# Patient Record
Sex: FEMALE | Race: WHITE | NOT HISPANIC OR LATINO
[De-identification: names, ages, dates, MRNs, and addresses within clinical notes are randomized per-mention and may not be internally consistent; named-entity substitution may affect disease eponyms.]

---

## 2019-03-30 ENCOUNTER — TRANSCRIPTION ENCOUNTER (OUTPATIENT)
Age: 54
End: 2019-03-30

## 2019-08-14 ENCOUNTER — RESULT REVIEW (OUTPATIENT)
Age: 54
End: 2019-08-14

## 2019-08-27 ENCOUNTER — OUTPATIENT (OUTPATIENT)
Dept: OUTPATIENT SERVICES | Facility: HOSPITAL | Age: 54
LOS: 1 days | End: 2019-08-27
Payer: COMMERCIAL

## 2019-08-27 DIAGNOSIS — C50.219 MALIGNANT NEOPLASM OF UPPER-INNER QUADRANT OF UNSPECIFIED FEMALE BREAST: ICD-10-CM

## 2019-08-27 LAB — SURGICAL PATHOLOGY STUDY: SIGNIFICANT CHANGE UP

## 2019-08-27 PROCEDURE — 88321 CONSLTJ&REPRT SLD PREP ELSWR: CPT

## 2019-08-28 ENCOUNTER — OUTPATIENT (OUTPATIENT)
Dept: OUTPATIENT SERVICES | Facility: HOSPITAL | Age: 54
LOS: 1 days | End: 2019-08-28
Payer: COMMERCIAL

## 2019-08-28 ENCOUNTER — APPOINTMENT (OUTPATIENT)
Dept: ULTRASOUND IMAGING | Facility: HOSPITAL | Age: 54
End: 2019-08-28
Payer: COMMERCIAL

## 2019-08-28 VITALS
WEIGHT: 175.93 LBS | OXYGEN SATURATION: 96 % | TEMPERATURE: 97 F | SYSTOLIC BLOOD PRESSURE: 118 MMHG | RESPIRATION RATE: 16 BRPM | HEIGHT: 66 IN | HEART RATE: 75 BPM | DIASTOLIC BLOOD PRESSURE: 70 MMHG

## 2019-08-28 PROCEDURE — 78195 LYMPH SYSTEM IMAGING: CPT

## 2019-08-28 PROCEDURE — 77065 DX MAMMO INCL CAD UNI: CPT

## 2019-08-28 PROCEDURE — C1739: CPT

## 2019-08-28 PROCEDURE — 78195 LYMPH SYSTEM IMAGING: CPT | Mod: 26

## 2019-08-28 PROCEDURE — 19285 PERQ DEV BREAST 1ST US IMAG: CPT

## 2019-08-28 PROCEDURE — 77065 DX MAMMO INCL CAD UNI: CPT | Mod: 26,RT

## 2019-08-28 PROCEDURE — A9541: CPT

## 2019-08-28 PROCEDURE — 19285 PERQ DEV BREAST 1ST US IMAG: CPT | Mod: RT

## 2019-08-28 NOTE — ASU PATIENT PROFILE, ADULT - PMH
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

## 2019-08-28 NOTE — ASU PATIENT PROFILE, ADULT - VISION (WITH CORRECTIVE LENSES IF THE PATIENT USUALLY WEARS THEM):
Normal vision: sees adequately in most situations; can see medication labels, newsprint DVT: HSQ  GOC: full code  Dispo: once pain is more controlled; will offer PT eval

## 2019-08-28 NOTE — ASU PATIENT PROFILE, ADULT - NS PRO AD PATIENT TYPE
Health Care Proxy (HCP) oneyda [phone]/Health Care Proxy (HCP) oneyda fragoso [phone] boyfriend/Health Care Proxy (HCP)

## 2019-08-29 ENCOUNTER — OUTPATIENT (OUTPATIENT)
Dept: OUTPATIENT SERVICES | Facility: HOSPITAL | Age: 54
LOS: 1 days | Discharge: HOME | End: 2019-08-29
Payer: COMMERCIAL

## 2019-08-29 ENCOUNTER — RESULT REVIEW (OUTPATIENT)
Age: 54
End: 2019-08-29

## 2019-08-29 VITALS — DIASTOLIC BLOOD PRESSURE: 75 MMHG | HEART RATE: 95 BPM | OXYGEN SATURATION: 96 % | SYSTOLIC BLOOD PRESSURE: 118 MMHG

## 2019-08-29 PROCEDURE — 88307 TISSUE EXAM BY PATHOLOGIST: CPT

## 2019-08-29 PROCEDURE — A4648: CPT

## 2019-08-29 PROCEDURE — 38525 BIOPSY/REMOVAL LYMPH NODES: CPT | Mod: RT

## 2019-08-29 PROCEDURE — 19499 UNLISTED PROCEDURE BREAST: CPT

## 2019-08-29 PROCEDURE — 88341 IMHCHEM/IMCYTCHM EA ADD ANTB: CPT

## 2019-08-29 PROCEDURE — 88305 TISSUE EXAM BY PATHOLOGIST: CPT

## 2019-08-29 PROCEDURE — 88360 TUMOR IMMUNOHISTOCHEM/MANUAL: CPT

## 2019-08-29 PROCEDURE — 76098 X-RAY EXAM SURGICAL SPECIMEN: CPT | Mod: 26

## 2019-08-29 PROCEDURE — 19301 PARTIAL MASTECTOMY: CPT | Mod: RT

## 2019-08-29 PROCEDURE — 76098 X-RAY EXAM SURGICAL SPECIMEN: CPT

## 2019-08-29 NOTE — PACU DISCHARGE NOTE - HYDRATION STATUS:
Satisfactory
Asthma    CAD (coronary artery disease)    Cancer of neck  s/p radiation 4-5 years ago  Depression    Diabetes mellitus    Gout    High cholesterol    HTN (hypertension)    Osteoporosis    PUD (peptic ulcer disease)

## 2019-08-29 NOTE — BRIEF OPERATIVE NOTE - OPERATION/FINDINGS
Excision of sentinel lymph node through incision inferior to the hair-bearing area using Renee  marker localization and methylene blue. Excision of breast mass through bridgett-areolar incision using Renee  marker localization. Biopsy clip & Renee  marker identified on intraoperative imaging. Additional  breast tissue excised for margin. Hemostasis achieved. Local tissue rearranged using 2-0 vicryl sutures. Primary closure using Vicryl & Monocryl sutures w/ dermabond.

## 2019-08-29 NOTE — BRIEF OPERATIVE NOTE - NSICDXBRIEFPROCEDURE_GEN_ALL_CORE_FT
PROCEDURES:  Lumpectomy of right breast with sentinel node biopsy 29-Aug-2019 10:26:12  Liliam Pappas

## 2019-08-29 NOTE — PACU DISCHARGE NOTE - COMMENTS
Pt denies pain and discomfort; operative sites clean, dry and intact with dermabond, surgical bra maintained; discharge instructions and med rec endorsed with verbalized understanding. Pt d/c home with  Frieda

## 2019-09-05 LAB — SURGICAL PATHOLOGY STUDY: SIGNIFICANT CHANGE UP

## 2020-06-11 NOTE — ASU PREOP CHECKLIST - VIA
Encounter addended by: Addis Turner RN on: 6/11/2020 5:42 PM   Actions taken: Charge Capture section accepted ambulate

## 2022-01-13 PROBLEM — C80.1 MALIGNANT (PRIMARY) NEOPLASM, UNSPECIFIED: Chronic | Status: ACTIVE | Noted: 2019-08-28

## 2022-01-13 PROBLEM — F41.9 ANXIETY DISORDER, UNSPECIFIED: Chronic | Status: ACTIVE | Noted: 2019-08-28

## 2022-01-13 PROBLEM — Q60.0 RENAL AGENESIS, UNILATERAL: Chronic | Status: ACTIVE | Noted: 2019-08-28

## 2022-01-13 PROBLEM — K58.9 IRRITABLE BOWEL SYNDROME WITHOUT DIARRHEA: Chronic | Status: ACTIVE | Noted: 2019-08-28

## 2022-01-19 ENCOUNTER — APPOINTMENT (OUTPATIENT)
Dept: ORTHOPEDIC SURGERY | Facility: CLINIC | Age: 57
End: 2022-01-19
Payer: MEDICAID

## 2022-01-19 VITALS
SYSTOLIC BLOOD PRESSURE: 128 MMHG | WEIGHT: 180 LBS | HEART RATE: 96 BPM | HEIGHT: 66 IN | BODY MASS INDEX: 28.93 KG/M2 | OXYGEN SATURATION: 98 % | DIASTOLIC BLOOD PRESSURE: 76 MMHG

## 2022-01-19 DIAGNOSIS — Z82.61 FAMILY HISTORY OF ARTHRITIS: ICD-10-CM

## 2022-01-19 DIAGNOSIS — Z80.3 FAMILY HISTORY OF MALIGNANT NEOPLASM OF BREAST: ICD-10-CM

## 2022-01-19 DIAGNOSIS — Z78.9 OTHER SPECIFIED HEALTH STATUS: ICD-10-CM

## 2022-01-19 DIAGNOSIS — Z80.8 FAMILY HISTORY OF MALIGNANT NEOPLASM OF OTHER ORGANS OR SYSTEMS: ICD-10-CM

## 2022-01-19 DIAGNOSIS — Z80.0 FAMILY HISTORY OF MALIGNANT NEOPLASM OF DIGESTIVE ORGANS: ICD-10-CM

## 2022-01-19 DIAGNOSIS — Z60.2 PROBLEMS RELATED TO LIVING ALONE: ICD-10-CM

## 2022-01-19 DIAGNOSIS — Z72.3 LACK OF PHYSICAL EXERCISE: ICD-10-CM

## 2022-01-19 PROCEDURE — 72110 X-RAY EXAM L-2 SPINE 4/>VWS: CPT

## 2022-01-19 PROCEDURE — 20611 DRAIN/INJ JOINT/BURSA W/US: CPT | Mod: RT

## 2022-01-19 PROCEDURE — 99204 OFFICE O/P NEW MOD 45 MIN: CPT | Mod: 25

## 2022-01-19 RX ORDER — ALPRAZOLAM 2 MG/1
TABLET ORAL
Refills: 0 | Status: ACTIVE | COMMUNITY

## 2022-01-19 SDOH — SOCIAL STABILITY - SOCIAL INSECURITY: PROBLEMS RELATED TO LIVING ALONE: Z60.2

## 2022-01-19 NOTE — PHYSICAL EXAM
[de-identified] : General: Well-nourished, well-developed, alert, and in no acute distress.\par Head: Normocephalic.\par Eyes: Pupils equal round reactive to light and accommodation, extraocular muscles intact, normal sclera.\par Nose: No nasal discharge.\par Cardiac: Regular rate. Extremities are warm and well perfused. Distal pulses are symmetric bilaterally.\par Respiratory: No labored breathing.\par Extremities: Sensation is intact distally bilaterally.  Distal pulses are symmetric bilaterally\par Lymphatic: No regional lymphadenopathy, no lymphedema\par Neurologic: No focal deficits\par Skin: Normal skin color, texture, and turgor\par Psychiatric: Normal affect\par MSK: as noted above/below\par \par \par \par Low Back:\par \par Inspection:\par ·	Alignment: No scoliosis or deformity. \par ·	Spasm not noted. \par ·	No scars\par \par Palpation:   \par ·	SI  Joints:  NO pain\par ·	Iliolumbar ligaments    RIGHT-SIDED pain\par ·	Quadratus lumborum   RIGHT-SIDED pain\par ·	Interspinous ligament  NO pain\par ·	Greater trochanter  RIGHT-SIDED pain\par ·	Mid thoracic spine: NO  pain\par \par \par ROM: \par ·	 Flexion:  70 degrees, without pain.\par ·	Extension:  5-10 degrees WITH MILD PAIN\par ·	Side Bending:  Full, without pain \par MILD PAIN WITH FACET LOADING ON RIGHT\par \par \par Strength: \par ·	Core:  Trendelenburg: FAIR-GOOD\par ·	Hip / Leg Flexion, Extension  5/5\par ·	Foot Eversion/ Inversion:   5/5\par ·	Calf:   5/5\par ·	EHL:  5/5\par \par NEURO  \par ·	Sensation:   Intact throughout the lower extremity\par ·	DTR's:  Symmetric throughout the lower extremity.  \par ·	Upper Motor Neuron:\par                 Toro Test: NEGATIVE\par                 Romberg Test: NEGATIVE\par                 Clonus: NEGATIVE\par \par Other tests: \par ·	Slump test: EQUIVOCAL ON RIGHT\par ·	Straight leg raise test:   NEGATIVE\par \par Vascular:  \par ·	No cyanosis, clubbing, edema.  \par ·	Palpable dorsalis pedis.\par \par \par LEFT HIP:\par \par Inspection: no bruising, erythema, rash, deformity \par Palpation: No Greater Trochanter pain , ITB pain , Hip Flexor pain  , Piriformis pain , Proximal Hamstring Pain , Groin pain \par ROM: normal Internal Rotation , External Rotation\par Strength: 5/5 Hip Flexion, Hip Extension, Hip Abduction, Hip Adduction\par \par Distal Pulses: normal\par Lower Extremity Sensation: normal \par Patellar/Achilles Reflex: normal\par \par Special Tests:\par Stinchfield: NEGATIVE \par Log Roll: NEGATIVE\par FABERE: NEGATIVE\par FADIR: NEGATIVE\par Obers: NEGATIVE\par \par \par RIGHT HIP:\par \par Inspection: no bruising, erythema, rash, deformity \par Palpation: PAIN AT GREATER TROCH/GLUTE MAX/PIRIFORMIS/ITB, NO Hip Flexor pain  , Proximal Hamstring Pain , Groin pain \par ROM: normal Internal Rotation , External Rotation\par Strength: 5/5 Hip Flexion, Hip Extension, Hip Abduction, Hip Adduction\par \par Distal Pulses: normal\par Lower Extremity Sensation: normal \par Patellar/Achilles Reflex: normal\par \par Special Tests:\par Stinchfield: NEGATIVE \par Log Roll: NEGATIVE\par FABERE: MILDLY POSITIVE\par FADIR: NEGATIVE\par Obers: POSITIVE\par \par  [de-identified] : Xray Lumbosacral Spine - Multiple views were reviewed with the patient in detail.  There is normal curvature.  There is no acute fracture.  There does not appear to be instability with flexion/extension.  Vertebral heights appear preserved.  Mild degenerative disc disease, prominent at L3-L4, L5-S1.  Facet arthropathy.\par \par

## 2022-01-19 NOTE — REVIEW OF SYSTEMS
[Negative] : Heme/Lymph [Joint Stiffness] : joint stiffness [Recent Weight Gain (___ Lbs)] : recent ~M [unfilled] lbs weight gain [Anxiety] : anxiety [Depression] : depression [Sleep Disturbances] : ~T sleep disturbances [Feeling Weak] : feeling weak [Muscle Weakness] : muscle weakness [Hot Flashes] : hot flashes

## 2022-01-19 NOTE — HISTORY OF PRESENT ILLNESS
[de-identified] : The patient is a 56 year old woman presenting with low back pain\par \par The patient has a history of breast CA, and has been in remission.\par \par She presents with a nearly 6 year history of chronic, atraumatic right-sided low back and lateral hip/gluteal pain.  The patient denies red flag symptoms including fever, chills, weight loss, night sweats, bowel/bladder dysfunction, saddle anesthesia.  Pain does not radiate past hip.  Some new, occasional radiation to anterior thigh.  She has been putting off symptoms because of ongoing care related to breast CA.  She had workup for fibromyalgia which was equivocal.  Pain is mostly activity based.  She has tried PT and chiropractics in the past which provide mild temporary relief of symptoms.  Accupressure and soft tissue seem to help periodically.\par \par Pain is rated 7/10, described as dull, improved with lying supine, worse with prolonged walking. [7] : a current pain level of 7/10

## 2022-01-19 NOTE — DISCUSSION/SUMMARY
[Medication Risks Reviewed] : Medication risks reviewed [de-identified] : The patient is a 56 year old woman with history of breast CA presenting with chronic, right-sided low back/lateral hip/glute pain.  She has mild L3-L4 disc disease, but no neurologic deficits.  Pain may be multifactorial in nature secondary to facet-related pain, greater trochanteric pain, gluteal tendinopathy/deep gluteal syndrome.\par \par Imaging/Diagnostics/Medical Records were interpreted and/or reviewed and results were reviewed with the patient in detail.  All questions were answered appropriately.\par \par \par I discussed the treatment of low back pain with the patient at length today. I described the spectrum of treatment from nonoperative modalities to surgery. Noninvasive and nonoperative treatment modalities include relative rest, weight reduction, activity modification, PRN use of acetaminophen/ anti-inflammatory medication if tolerated, corticosteroids if indicated, home exercise program and physical therapy.  We also discussed adjunctive treatment including heat therapy, accupuncture, manual therapy, TENS unit.  Further treatments can include interventional spine procedures such as epidural injections, and surgical consultation.\par \par After informed consent, and explanation of risks, benefits, alternatives, adverse effects of injection, which includes but is not limited to infection, bleeding, allergic reaction, swelling, soft tissue weakening/tendon rupture, neurovascular injury, injection site complication, fat atrophy, skin depigmentation, failure to improve symptoms, the patient would like to proceed with the procedure - RIGHT GREATER TORCH BURSA ULTRASOUND-GUIDED DIAGNOSTIC/THERAPEUTIC CORTICOSTEROID INJECTION. See procedure note above. Patient tolerated the procedure well. The patient was provided with postinjection instructions.\par \par Patient was prescribed a course of physical therapy today.  The patient was also provided some general home exercises.  The patient was counseled on activity modification.\par \par Follow-up in 6 weeks.  If symptoms persist, consider MRI L-spine.\par \par ------------------------------------------------------------------------------------------------------------------\par Patient appreciates and agrees with current plan.\par \par The patient's diagnosis above was evaluated by me, personally.  Diagnostic Testing and treatment options were discussed with the patient in detail.  The risks/benefits/potential complications of diagnostic testing/treatments were described in detail.  \par \par This note was generated using a mixture of manual typing and dragon medical dictation software.  A reasonable effort has been made for proofreading its contents, but typos may still remain.  If there are any questions or points of clarification needed please notify my office.\par \par \par >45 minutes of time was spent on total encounter.  >50% of the visit was spent on face-to-face counseling/coordination of care and medical-decision making for this patient.\par \par \par

## 2022-01-19 NOTE — PROCEDURE
[de-identified] : ULTRASOUND-GUIDED TROCHANTERIC BURSA INJECTION\par Indication for Ultrasound: To visualize trochanteric bursa and avoid damage to tendons and surrounding neurovascular structures\par \par Injection: RIGHT Hip.\par Indication: Trochanteric Bursitis.\par \par A discussion was had with the patient regarding this procedure and all questions were answered. All risks, benefits and alternatives were discussed. These included but were not limited to bleeding, infection, injection site reaction/complication and allergic reaction. A timeout was done to ensure correct side and pt agreed to the procedure.  Alcohol was used to clean the skin, and betadine was used to sterilize and prep the area in the lateral aspect of the hip. The trochanteric bursa was visualized utilizing the Sonosite, linear transducer. The joint was visualized in the short axis and an in-plane approach was used for the injection. Ultrasound guidance was utilized to ensure accuracy of the injection.  A 22-gauge needle was used to inject 2cc of 1% lidocaine, 1cc of 0.25% bupivicaine and 1cc of kenalog into the greater trochanteric bursa using a needling technique. A sterile bandage was then applied. The patient tolerated the procedure well and there were no complications.\par

## 2022-02-03 ENCOUNTER — NON-APPOINTMENT (OUTPATIENT)
Age: 57
End: 2022-02-03

## 2022-04-04 RX ORDER — MELOXICAM 15 MG/1
15 TABLET ORAL DAILY
Qty: 1 | Refills: 1 | Status: COMPLETED | COMMUNITY
Start: 2022-03-22 | End: 2022-04-04

## 2022-04-11 ENCOUNTER — APPOINTMENT (OUTPATIENT)
Dept: PHYSICAL MEDICINE AND REHAB | Facility: CLINIC | Age: 57
End: 2022-04-11

## 2022-05-31 ENCOUNTER — APPOINTMENT (OUTPATIENT)
Dept: PHYSICAL MEDICINE AND REHAB | Facility: CLINIC | Age: 57
End: 2022-05-31

## 2022-05-31 NOTE — DATA REVIEWED
[FreeTextEntry1] : Date of Exam: 02-\par  \par EXAM:  MRI LUMBAR SPINE WITHOUT CONTRAST\par \par HISTORY:  Pain\par \par TECHNIQUE:  Multiplanar, multi-sequence MRI of the lumbar spine was obtained on a 3T scanner according to standard protocol.\par \par COMPARISON:  None Available.\par \par FINDINGS:  \par Alignment is anatomic in the sagittal plane. \par Vertebral body heights are maintained.\par Mild disc desiccation.\par No acute fractures are identified. Marrow signal is unremarkable. \par Conus is normal in position, signal, and morphology.\par Paraspinal soft tissues are unremarkable. \par \par The findings at the individual levels are as follows:\par \par L1-L2: No spinal or neuroforaminal stenosis.\par \par L2-L3: No spinal or neuroforaminal stenosis.\par \par L3-L4: Small right foraminal disc protrusion results in mild right foraminal stenosis. No spinal or left neuroforaminal stenosis.\par \par L4-L5: Minimal bilateral foraminal disc protrusions. Mild bilateral facet joint arthrosis. Findings result in minimal bilateral foraminal stenosis. No spinal stenosis.\par \par L5-S1: Mild bilateral facet joint arthrosis. No spinal or neuroforaminal stenosis.\par \par IMPRESSION: MRI of the lumbar spine demonstrates:\par \par 1.  Small right foraminal disc protrusion at L3-L4 results in mild right foraminal stenosis.\par 2.  Minimal bilateral foraminal disc protrusions at L4-L5 contribute to minimal bilateral foraminal stenosis.\par 3.  Mild facet joint arthrosis at L4-L5 and L5-S1.\par

## 2022-06-22 ENCOUNTER — NON-APPOINTMENT (OUTPATIENT)
Age: 57
End: 2022-06-22

## 2022-06-22 ENCOUNTER — APPOINTMENT (OUTPATIENT)
Dept: ORTHOPEDIC SURGERY | Facility: CLINIC | Age: 57
End: 2022-06-22
Payer: MEDICAID

## 2022-06-22 VITALS — HEIGHT: 66 IN | WEIGHT: 175 LBS | RESPIRATION RATE: 16 BRPM | BODY MASS INDEX: 28.12 KG/M2

## 2022-06-22 PROCEDURE — 73130 X-RAY EXAM OF HAND: CPT | Mod: 50

## 2022-06-22 PROCEDURE — 99203 OFFICE O/P NEW LOW 30 MIN: CPT

## 2022-06-22 PROCEDURE — 73140 X-RAY EXAM OF FINGER(S): CPT | Mod: RT

## 2022-06-27 ENCOUNTER — APPOINTMENT (OUTPATIENT)
Dept: PHYSICAL MEDICINE AND REHAB | Facility: CLINIC | Age: 57
End: 2022-06-27
Payer: MEDICAID

## 2022-06-27 VITALS
BODY MASS INDEX: 28.12 KG/M2 | SYSTOLIC BLOOD PRESSURE: 116 MMHG | HEIGHT: 66 IN | DIASTOLIC BLOOD PRESSURE: 76 MMHG | HEART RATE: 71 BPM | OXYGEN SATURATION: 95 % | WEIGHT: 175 LBS

## 2022-06-27 PROCEDURE — 99203 OFFICE O/P NEW LOW 30 MIN: CPT

## 2022-06-27 NOTE — HISTORY OF PRESENT ILLNESS
[FreeTextEntry1] : Referring Physician: Dr. Morataya \par \par Ms. ARACELIS MCKEON is a very pleasant 56 year female who comes in for evaluation of Right Hip Pain that has been ongoing for several months without any specific injury or inciting event. Patient has had USG GTB CSI which did provide relief, she also tried PT which did not help but she feels was inadequate. The pain is located primarily in the RIGHT buttock/lateral Hip radiating down the side of the Right thigh, intermittent and described as sharp, achy. The pain is rated as 5/10 and ranges from 4-8/10. The patient's symptoms are aggravated by walking, standing, bending  and alleviated by laying down. The patient works as a Commercial  which consists of walking. The patient denies any night pain, numbness/tingling, weakness, or bowel/bladder dysfunction. The patient has no other complaints at this time.\par

## 2022-06-27 NOTE — DATA REVIEWED
[MRI] : MRI [FreeTextEntry1] : MRI LUMBAR SPINE 02/2022: Small right foraminal disc protrusion at L3-L4. Mild facet joint arthrosis at L4-L5 and L5-S1.

## 2022-06-27 NOTE — ASSESSMENT
[FreeTextEntry1] : Impression:\par 1. RIGHT GTPS/ITBS\par \par Plan: The history, physical examination and imaging were reviewed. The imaging findings and diagnosis were discussed with the patient along with treatment options including physical therapy, oral medication, interventional procedures, and surgery; as well as alternative therapeutics such as acupuncture and massage. We also discussed the importance of activity modification, ergonomics, and biomechanics in the treatment of the current treatment and long term management of the condition. I am recommending that the patient revisit a course of physical therapy, new locations were provided. We emphasized the importance of the home exercise program. We may consider repeat CSI if needed. The patient will follow up in 6-8 weeks. The patient expressed verbal understanding and is in agreement with the plan of care. All of the patient's questions and concerns were addressed during today's visit.

## 2022-06-27 NOTE — PHYSICAL EXAM
[FreeTextEntry1] : LUMBAR\par GEN: AAOx3. NAD.\par LS ROM: Flexion, extension, side-bending, rotation, oblique extension all full/pain free.  \par HIP ROM: Full (+) pain end-range ER-Right\par PALP: No TTP midline spinous processes, paravertebral muscles, SIJ, or greater trochanters B/L.\par INSP: Spine alignment is midline, with no evidence of scoliosis.\par STRENGTH: 5/5 bilateral hip flexors, knee extensors, ankle dorsiflexors, long toe extensors, ankle plantar flexors, hip abductors. (+) Resisted Hip ABd RIGHT.\par SENS: Grossly intact to LT BLE.\par REFLEXES: Symmetric patella, achilles. \par TONE: Normal, No clonus.\par STANCE: No Trendelenburg with single leg stance.\par GAIT: Non antalgic, normal reciprocating heel to toe\par SPECIAL: SLR and Slump (-) bilaterally. JESSIR (-) B/L. BEHZAD (+) RT.

## 2022-07-06 ENCOUNTER — RX RENEWAL (OUTPATIENT)
Age: 57
End: 2022-07-06

## 2022-07-11 ENCOUNTER — NON-APPOINTMENT (OUTPATIENT)
Age: 57
End: 2022-07-11

## 2022-07-11 ENCOUNTER — APPOINTMENT (OUTPATIENT)
Dept: ORTHOPEDIC SURGERY | Facility: CLINIC | Age: 57
End: 2022-07-11

## 2022-07-11 DIAGNOSIS — S69.90XA UNSPECIFIED INJURY OF UNSPECIFIED WRIST, HAND AND FINGER(S), INITIAL ENCOUNTER: ICD-10-CM

## 2022-07-11 PROCEDURE — ZZZZZ: CPT

## 2022-07-12 PROBLEM — S69.90XA WRIST INJURY: Status: ACTIVE | Noted: 2022-06-22

## 2022-08-31 ENCOUNTER — APPOINTMENT (OUTPATIENT)
Dept: ORTHOPEDIC SURGERY | Facility: CLINIC | Age: 57
End: 2022-08-31

## 2022-09-07 ENCOUNTER — APPOINTMENT (OUTPATIENT)
Dept: ORTHOPEDIC SURGERY | Facility: CLINIC | Age: 57
End: 2022-09-07

## 2022-09-19 ENCOUNTER — NON-APPOINTMENT (OUTPATIENT)
Age: 57
End: 2022-09-19

## 2022-09-19 ENCOUNTER — APPOINTMENT (OUTPATIENT)
Dept: INTERNAL MEDICINE | Facility: CLINIC | Age: 57
End: 2022-09-19

## 2022-09-19 ENCOUNTER — MED ADMIN CHARGE (OUTPATIENT)
Age: 57
End: 2022-09-19

## 2022-09-19 VITALS
HEIGHT: 66 IN | TEMPERATURE: 97.2 F | HEART RATE: 76 BPM | SYSTOLIC BLOOD PRESSURE: 115 MMHG | WEIGHT: 185 LBS | BODY MASS INDEX: 29.73 KG/M2 | OXYGEN SATURATION: 97 % | DIASTOLIC BLOOD PRESSURE: 79 MMHG

## 2022-09-19 DIAGNOSIS — Q60.0 RENAL AGENESIS, UNILATERAL: ICD-10-CM

## 2022-09-19 DIAGNOSIS — F41.9 ANXIETY DISORDER, UNSPECIFIED: ICD-10-CM

## 2022-09-19 DIAGNOSIS — Z82.49 FAMILY HISTORY OF ISCHEMIC HEART DISEASE AND OTHER DISEASES OF THE CIRCULATORY SYSTEM: ICD-10-CM
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
## 2022-09-19 DIAGNOSIS — Z83.3 FAMILY HISTORY OF DIABETES MELLITUS: ICD-10-CM

## 2022-09-19 DIAGNOSIS — Z78.9 OTHER SPECIFIED HEALTH STATUS: ICD-10-CM

## 2022-09-19 DIAGNOSIS — F32.A ANXIETY DISORDER, UNSPECIFIED: ICD-10-CM
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
## 2022-09-19 PROCEDURE — 90715 TDAP VACCINE 7 YRS/> IM: CPT

## 2022-09-19 PROCEDURE — G0009: CPT | Mod: 59

## 2022-09-19 PROCEDURE — 99386 PREV VISIT NEW AGE 40-64: CPT | Mod: GC,25

## 2022-09-19 PROCEDURE — 90732 PPSV23 VACC 2 YRS+ SUBQ/IM: CPT

## 2022-09-19 PROCEDURE — 90472 IMMUNIZATION ADMIN EACH ADD: CPT

## 2022-09-20 DIAGNOSIS — Z23 ENCOUNTER FOR IMMUNIZATION: ICD-10-CM

## 2022-09-27 ENCOUNTER — APPOINTMENT (OUTPATIENT)
Dept: ORTHOPEDIC SURGERY | Facility: CLINIC | Age: 57
End: 2022-09-27

## 2022-09-27 DIAGNOSIS — S69.90XA UNSPECIFIED INJURY OF UNSPECIFIED WRIST, HAND AND FINGER(S), INITIAL ENCOUNTER: ICD-10-CM

## 2022-09-27 PROCEDURE — 99213 OFFICE O/P EST LOW 20 MIN: CPT

## 2022-09-27 PROCEDURE — 73130 X-RAY EXAM OF HAND: CPT | Mod: 50

## 2022-10-03 DIAGNOSIS — M54.50 LOW BACK PAIN, UNSPECIFIED: ICD-10-CM

## 2022-10-07 ENCOUNTER — APPOINTMENT (OUTPATIENT)
Dept: PHYSICAL MEDICINE AND REHAB | Facility: CLINIC | Age: 57
End: 2022-10-07

## 2022-10-17 ENCOUNTER — APPOINTMENT (OUTPATIENT)
Dept: PHYSICAL MEDICINE AND REHAB | Facility: CLINIC | Age: 57
End: 2022-10-17

## 2022-10-17 VITALS — HEIGHT: 66 IN | WEIGHT: 185 LBS | BODY MASS INDEX: 29.73 KG/M2

## 2022-10-17 PROCEDURE — 99214 OFFICE O/P EST MOD 30 MIN: CPT

## 2022-10-18 ENCOUNTER — APPOINTMENT (OUTPATIENT)
Dept: INTERNAL MEDICINE | Facility: CLINIC | Age: 57
End: 2022-10-18

## 2022-10-18 NOTE — ASSESSMENT
[FreeTextEntry1] : Impression:\par 1. RIGHT GTPS/ITBS\par \par Plan: The history, physical examination and imaging were reviewed. The diagnosis was reviewed with the patient, and symptoms continue to be more consistent with hip etiology rather than spine. The patient has done >6 weeks of conservative treatment with therapeutic exercise, activity modification, and oral medication without significant improvement. We will obtain MRI of the RIGHT Hip to further assess the gluteal tendons. We may consider repeat CSI vs PRP. The patient will follow up for imaging review. The patient expressed verbal understanding and is in agreement with the plan of care. All of the patient's questions and concerns were addressed during today's visit.

## 2022-10-18 NOTE — HISTORY OF PRESENT ILLNESS
[FreeTextEntry1] : 10/18/2022: Patient is here for follow up after participating in physical therapy since the previous visit. The patient has been compliant with both formal sessions as well as HEP, and reports today that there has been no significant change in symptoms. She continues to experience RIGHT lateral hip/thigh pain, mostly with standing/walking. The patient has no new complaints today. \par \par M/sNeville MCKEON is a very pleasant 56 year female who comes in for evaluation of Right Hip Pain that has /been ongoing for several months without any specific injury or inciting event. Patient has had USG GTB CSI which did provide relief, she also tried PT which did not help but she feels was inadequate. The pain is located primarily in the RIGHT buttock/lateral Hip radiating down the side of the Right thigh, intermittent and described as sharp, achy. The pain is rated as 5/10 and ranges from 4-8/10. The patient's symptoms are aggravated by walking, standing, bending  and alleviated by laying down. The patient works as a Commercial  which consists of walking. The patient denies any night pain, numbness/tingling, weakness, or bowel/bladder dysfunction. The patient has no other complaints at this time.\par

## 2022-11-08 ENCOUNTER — APPOINTMENT (OUTPATIENT)
Dept: INTERNAL MEDICINE | Facility: CLINIC | Age: 57
End: 2022-11-08

## 2022-11-11 ENCOUNTER — APPOINTMENT (OUTPATIENT)
Dept: INTERNAL MEDICINE | Facility: CLINIC | Age: 57
End: 2022-11-11

## 2022-11-14 ENCOUNTER — APPOINTMENT (OUTPATIENT)
Dept: INTERNAL MEDICINE | Facility: CLINIC | Age: 57
End: 2022-11-14

## 2022-11-14 VITALS
BODY MASS INDEX: 28.93 KG/M2 | TEMPERATURE: 98.3 F | HEIGHT: 66 IN | OXYGEN SATURATION: 96 % | DIASTOLIC BLOOD PRESSURE: 71 MMHG | HEART RATE: 82 BPM | WEIGHT: 180 LBS | SYSTOLIC BLOOD PRESSURE: 116 MMHG

## 2022-11-14 DIAGNOSIS — Z85.3 PERSONAL HISTORY OF MALIGNANT NEOPLASM OF BREAST: ICD-10-CM

## 2022-11-14 DIAGNOSIS — R30.0 DYSURIA: ICD-10-CM

## 2022-11-14 DIAGNOSIS — R07.81 PLEURODYNIA: ICD-10-CM

## 2022-11-14 PROCEDURE — 81003 URINALYSIS AUTO W/O SCOPE: CPT | Mod: QW

## 2022-11-14 PROCEDURE — 99214 OFFICE O/P EST MOD 30 MIN: CPT | Mod: 25,GC

## 2022-11-14 RX ORDER — ESCITALOPRAM OXALATE 5 MG/1
TABLET, FILM COATED ORAL
Refills: 0 | Status: DISCONTINUED | COMMUNITY
End: 2022-11-14

## 2022-11-14 RX ORDER — METHOCARBAMOL 500 MG/1
500 TABLET, FILM COATED ORAL
Qty: 60 | Refills: 1 | Status: DISCONTINUED | COMMUNITY
Start: 2022-03-22 | End: 2022-11-14

## 2022-11-15 ENCOUNTER — RESULT CHARGE (OUTPATIENT)
Age: 57
End: 2022-11-15

## 2022-11-15 ENCOUNTER — LABORATORY RESULT (OUTPATIENT)
Age: 57
End: 2022-11-15

## 2022-11-15 LAB
BILIRUB UR QL STRIP: NEGATIVE
CLARITY UR: CLEAR
COLLECTION METHOD: NORMAL
GLUCOSE UR-MCNC: NEGATIVE
HCG UR QL: 0.2 EU/DL
HGB UR QL STRIP.AUTO: NORMAL
KETONES UR-MCNC: NEGATIVE
LEUKOCYTE ESTERASE UR QL STRIP: NORMAL
NITRITE UR QL STRIP: NEGATIVE
PH UR STRIP: 5.5
PROT UR STRIP-MCNC: NEGATIVE
SP GR UR STRIP: 1.02

## 2022-11-16 LAB
APPEARANCE: ABNORMAL
BILIRUBIN URINE: NEGATIVE
BLOOD URINE: ABNORMAL
COLOR: YELLOW
GLUCOSE QUALITATIVE U: NEGATIVE
KETONES URINE: NEGATIVE
LEUKOCYTE ESTERASE URINE: ABNORMAL
NITRITE URINE: POSITIVE
PH URINE: 5.5
PROTEIN URINE: NORMAL
SPECIFIC GRAVITY URINE: 1.02
UROBILINOGEN URINE: NORMAL

## 2022-11-21 ENCOUNTER — NON-APPOINTMENT (OUTPATIENT)
Age: 57
End: 2022-11-21

## 2022-12-05 ENCOUNTER — APPOINTMENT (OUTPATIENT)
Dept: INTERNAL MEDICINE | Facility: CLINIC | Age: 57
End: 2022-12-05

## 2022-12-05 PROCEDURE — 99442: CPT | Mod: GC

## 2022-12-05 NOTE — HISTORY OF PRESENT ILLNESS
[Home] : at home, [unfilled] , at the time of the visit. [Medical Office: ([facility])___] : at the medical office located in  [Verbal consent obtained from patient] : the patient, [unfilled] [FreeTextEntry8] : 58yo F PMH anxiety, depression, congenital single kidney, breast Ca s/p lumpectomy and radiation, now in remission presenting virtually for telephonic visit to discuss right lower abdominal pain. States her abdominal pain started a few days after her last appt in November 2022. At first her throat was bothering her, then it progressed to GI upset and she felt nauseous and was having diarrhea and a few episodes of vomiting. States she never felt feverish or experienced chills or other infectious symptoms. About a week ago she started feeling pain in the lower right side of her abdomen, at the level of her bladder, and it was distinct from the stomach upset she was already feeling. The pain initially did not radiate but lately she has been experiencing right-sided back pain as well. The pain progressed over the weekend and she went to urgent care for evaluation. She states they took a urine sample but never informed her of her results. They did a routine abdominal exam and voiced concern for diverticulitis, and recommended she contact her PCP for a gastroenterology referral. Patient also states that in the past she had imaging that revealed some of her anatomy is flipped, and that the doctor she saw at that time advised her to inform all her future providers of this. Given her possible differing anatomy, there was low concern for appendicitis, despite the location of the pain.\par She states the pain is still present and has not improved at all, and she is still nauseous. States she has been eating less and believes she has lost 5-7lbs since symptom onset.

## 2022-12-05 NOTE — REVIEW OF SYSTEMS
[Recent Change In Weight] : ~T recent weight change [Abdominal Pain] : abdominal pain [Nausea] : nausea [Diarrhea] : diarrhea [Negative] : Respiratory [Constipation] : no constipation [Dysuria] : no dysuria [FreeTextEntry7] : right lower quadrant abdominal pain, radiating to the right side of her back

## 2022-12-05 NOTE — END OF VISIT
[] : Resident [FreeTextEntry3] : With acute right lower quadrant pain\par Since last visit, has had runny nose, then developed the pain, with nausea \par Went to UC who recommended GI referral for eval \par Patient states her organs may be "reversed" and her appendix is closer to left, however do not have imaging to correlate\par Discussed with patient, recommend in person eval of pain, has diff can include kidney stone vs ovarian cyst vs appendicitis vs diverticulitis vs gastroenteritis \par Pt amenable, will come in tomorrow morning for full eval of abdominal pain [Time Spent: ___ minutes] : I have spent [unfilled] minutes of time on the encounter.

## 2022-12-05 NOTE — PLAN
[FreeTextEntry1] : 58yo F PMH anxiety, depression, congenital single kidney (occasionally has hematuria), breast Ca s/p lumpectomy and radiation, now in remission presenting virtually for telephonic visit to discuss right lower abdominal pain.\par \par #abdominal pain\par Localized to RLQ, reportedly adjacent to bladder. Pain is reportedly palpable though pt reports no rash or visible wound so low concern for shingles. No reports of recent injury/trauma/fall. DDx includes issues pertaining to the GI and  systems including but not limited to gastritis, diverticulitis, appendicitis, ovarian torsion, UTI, STD, kidney stone.\par - advised pt to schedule in-person appt so a provider can perform a physical exam to determine what imaging or additional tests may be appropriate to diagnose the problem\par - pt has appt scheduled for 12/6/2022

## 2022-12-06 ENCOUNTER — LABORATORY RESULT (OUTPATIENT)
Age: 57
End: 2022-12-06

## 2022-12-06 ENCOUNTER — APPOINTMENT (OUTPATIENT)
Dept: INTERNAL MEDICINE | Facility: CLINIC | Age: 57
End: 2022-12-06

## 2022-12-06 VITALS
OXYGEN SATURATION: 99 % | TEMPERATURE: 97.8 F | DIASTOLIC BLOOD PRESSURE: 70 MMHG | RESPIRATION RATE: 17 BRPM | WEIGHT: 180 LBS | HEART RATE: 68 BPM | BODY MASS INDEX: 28.93 KG/M2 | HEIGHT: 66 IN | SYSTOLIC BLOOD PRESSURE: 110 MMHG

## 2022-12-06 PROCEDURE — 99213 OFFICE O/P EST LOW 20 MIN: CPT | Mod: 25

## 2022-12-06 NOTE — PHYSICAL EXAM
[No Acute Distress] : no acute distress [Normal Sclera/Conjunctiva] : normal sclera/conjunctiva [PERRL] : pupils equal round and reactive to light [Normal Outer Ear/Nose] : the outer ears and nose were normal in appearance [Normal Oropharynx] : the oropharynx was normal [No JVD] : no jugular venous distention [No Lymphadenopathy] : no lymphadenopathy [No Respiratory Distress] : no respiratory distress  [No Accessory Muscle Use] : no accessory muscle use [Clear to Auscultation] : lungs were clear to auscultation bilaterally [Normal Rate] : normal rate  [Regular Rhythm] : with a regular rhythm [Normal S1, S2] : normal S1 and S2 [No Carotid Bruits] : no carotid bruits [No Abdominal Bruit] : a ~M bruit was not heard ~T in the abdomen [No Edema] : there was no peripheral edema [No Palpable Aorta] : no palpable aorta [Soft] : abdomen soft [Non Tender] : non-tender [Non-distended] : non-distended [No HSM] : no HSM [Normal Bowel Sounds] : normal bowel sounds [No CVA Tenderness] : no CVA  tenderness [No Spinal Tenderness] : no spinal tenderness [No Joint Swelling] : no joint swelling [Grossly Normal Strength/Tone] : grossly normal strength/tone [No Rash] : no rash [Coordination Grossly Intact] : coordination grossly intact [No Focal Deficits] : no focal deficits [Normal Gait] : normal gait

## 2022-12-07 LAB
ALBUMIN SERPL ELPH-MCNC: 4.3 G/DL
ALP BLD-CCNC: 74 U/L
ALT SERPL-CCNC: 29 U/L
ANION GAP SERPL CALC-SCNC: 16 MMOL/L
AST SERPL-CCNC: 21 U/L
BASOPHILS # BLD AUTO: 0.04 K/UL
BASOPHILS NFR BLD AUTO: 0.4 %
BILIRUB SERPL-MCNC: 0.3 MG/DL
BUN SERPL-MCNC: 11 MG/DL
CALCIUM SERPL-MCNC: 8.9 MG/DL
CHLORIDE SERPL-SCNC: 103 MMOL/L
CO2 SERPL-SCNC: 23 MMOL/L
CREAT SERPL-MCNC: 0.79 MG/DL
EGFR: 88 ML/MIN/1.73M2
EOSINOPHIL # BLD AUTO: 0.12 K/UL
EOSINOPHIL NFR BLD AUTO: 1.3 %
GLUCOSE SERPL-MCNC: 103 MG/DL
HCT VFR BLD CALC: 40.1 %
HGB BLD-MCNC: 12.9 G/DL
IMM GRANULOCYTES NFR BLD AUTO: 0.3 %
LYMPHOCYTES # BLD AUTO: 2.37 K/UL
LYMPHOCYTES NFR BLD AUTO: 26.1 %
MAN DIFF?: NORMAL
MCHC RBC-ENTMCNC: 28.4 PG
MCHC RBC-ENTMCNC: 32.2 GM/DL
MCV RBC AUTO: 88.3 FL
MONOCYTES # BLD AUTO: 0.39 K/UL
MONOCYTES NFR BLD AUTO: 4.3 %
NEUTROPHILS # BLD AUTO: 6.12 K/UL
NEUTROPHILS NFR BLD AUTO: 67.6 %
PLATELET # BLD AUTO: 265 K/UL
POTASSIUM SERPL-SCNC: 4 MMOL/L
PROT SERPL-MCNC: 6.8 G/DL
RBC # BLD: 4.54 M/UL
RBC # FLD: 12.2 %
SODIUM SERPL-SCNC: 142 MMOL/L
WBC # FLD AUTO: 9.07 K/UL

## 2022-12-08 DIAGNOSIS — N39.0 URINARY TRACT INFECTION, SITE NOT SPECIFIED: ICD-10-CM

## 2022-12-08 DIAGNOSIS — A49.9 URINARY TRACT INFECTION, SITE NOT SPECIFIED: ICD-10-CM

## 2022-12-08 LAB
APPEARANCE: ABNORMAL
BILIRUBIN URINE: NEGATIVE
BLOOD URINE: ABNORMAL
COLOR: YELLOW
GLUCOSE QUALITATIVE U: NEGATIVE
KETONES URINE: NEGATIVE
LEUKOCYTE ESTERASE URINE: ABNORMAL
NITRITE URINE: POSITIVE
PH URINE: 6
PROTEIN URINE: ABNORMAL
SPECIFIC GRAVITY URINE: 1.02
UROBILINOGEN URINE: NORMAL

## 2022-12-08 NOTE — ASSESSMENT
[FreeTextEntry1] : Pt is a 56yo F PMH anxiety, depression, congenital single kidney, breast Ca s/p lumpectomy and radiation, now in remission presenting with RLQ abdominal.\par \par #RLQ abdominal pain\par Pt with approx 1 week hx of abdominal pain with improving systemic symptoms (fatigue, weakness) but constant RLQ abdominal pain that has not improved. Differential diagnosis includes diverticulitis, appendicitis, colitis. Although the persistent pain is somewhat concerning, patient is non toxic appearing, is no acute distress, and abdominal exam is fairly unremarkable. Discussed with patient that to be completely certain of no infectious or acute process, pt should go to emergency room for stat labs and imaging. Otherwise, given patient's clinical appearance, can also choose to obtain bloodwork in clinic and follow up depending on the results of the labwork. Pt elected to undergo bloodwork today in clinic and wait for the results, understanding that should her pain worsen, she was instructed to go to the ED. \par \par - Obtain CBC, CMP, UA\par - Pt instructed to report to ED if symptoms worsen\par \par #HCM\par - Pt reports she is UTD on mammogram, pap smear, colon ca screening, and immunizations.

## 2022-12-08 NOTE — REVIEW OF SYSTEMS
[Abdominal Pain] : abdominal pain [Nausea] : nausea [Anxiety] : anxiety [Negative] : Neurological [Constipation] : no constipation [Diarrhea] : diarrhea [Vomiting] : no vomiting

## 2022-12-08 NOTE — HISTORY OF PRESENT ILLNESS
[FreeTextEntry1] : RLQ abdominal pain [de-identified] : Pt is a 56yo F PMH anxiety, depression, congenital single kidney, breast Ca s/p lumpectomy and radiation, now in remission presenting with RLQ abdominal. Pt states that the pain started about a week ago, severity 5-6/10, worsens to 8/10 with pressure/movement, relieved with laying flat, appears to radiate in all general directions, and is constant. Associated symptoms include nausea and decreased appetite, generalized weakness. Pt denies any recent vomiting, diarrhea, constipation, fevers, chills. Overall, systemic symptoms such as fatigue, malaise, and weakness are improving, however pt states that the pain the RLQ has not changed.

## 2022-12-29 DIAGNOSIS — M51.36 OTHER INTERVERTEBRAL DISC DEGENERATION, LUMBAR REGION: ICD-10-CM

## 2022-12-29 RX ORDER — DICLOFENAC SODIUM 75 MG/1
75 TABLET, DELAYED RELEASE ORAL TWICE DAILY
Qty: 28 | Refills: 1 | Status: ACTIVE | COMMUNITY
Start: 2022-12-29 | End: 1900-01-01

## 2023-02-08 ENCOUNTER — APPOINTMENT (OUTPATIENT)
Dept: MRI IMAGING | Facility: CLINIC | Age: 58
End: 2023-02-08
Payer: MEDICAID

## 2023-02-08 ENCOUNTER — OUTPATIENT (OUTPATIENT)
Dept: OUTPATIENT SERVICES | Facility: HOSPITAL | Age: 58
LOS: 1 days | End: 2023-02-08

## 2023-02-08 PROCEDURE — 73721 MRI JNT OF LWR EXTRE W/O DYE: CPT | Mod: 26,RT

## 2023-02-14 ENCOUNTER — APPOINTMENT (OUTPATIENT)
Dept: PHYSICAL MEDICINE AND REHAB | Facility: CLINIC | Age: 58
End: 2023-02-14
Payer: MEDICAID

## 2023-02-14 PROCEDURE — 99443: CPT

## 2023-02-24 ENCOUNTER — APPOINTMENT (OUTPATIENT)
Dept: PHYSICAL MEDICINE AND REHAB | Facility: CLINIC | Age: 58
End: 2023-02-24

## 2023-03-13 ENCOUNTER — APPOINTMENT (OUTPATIENT)
Dept: PHYSICAL MEDICINE AND REHAB | Facility: CLINIC | Age: 58
End: 2023-03-13
Payer: MEDICAID

## 2023-03-13 VITALS — HEIGHT: 66 IN | WEIGHT: 180 LBS | BODY MASS INDEX: 28.93 KG/M2

## 2023-03-13 PROCEDURE — 20611 DRAIN/INJ JOINT/BURSA W/US: CPT | Mod: RT

## 2023-03-13 NOTE — PROCEDURE
[de-identified] : Procedure: RIGHT Hip Greater Trochanter Bursa Injection with Ultrasound Guidance\par \par Findings: The RIGHT greater trochanteric bursa region was examined under ultrasound using a 10mHz linear array transducer. There was evidence of a mild bursal effusion in the trochanteric bursa.\par \par Injectate: 1 mL Kenalog (40mg/mL) with 2mL 0.25% Bupivicaine and 2mL 1% Lidocaine\par \par After risks, benefits and alternatives were discussed and the patient expressed understanding, informed consent was obtained. Risks include but are not limited to bleeding, infection, worsening pain, nerve damage, scar formation. \par \par Ultrasound was indicated for needle guidance, to ensure needle placement, and to assess for any effusions or vasculature in the path of the needle.\par \par The RIGHT trochanteric bursa region was identified by ultrasound, and probe location was marked on the skin. Ultrasound findings are noted above.  The overlying area was prepped and draped in a sterile fashion with Chloraprep.  After skin preparation, a 25 gauge needle was used to anesthetize the skin with 3 mL of 1% Lidocaine. Then a 3.5" 22 gauge spinal needle was then introduced and advanced under ultrasound guidance, needle tip position was confirmed, aspiration for blood prior to injection was negative.  The injectate solution mentioned above was administered into the trochanteric bursa under direct ultrasound visualization without resistance. Fluid distention was appreciated on ultrasound confirming placement. Ultrasound images were permanently stored as part of the patient’s record. \par \par At the conclusion of the procedure the needle was removed, the area was cleaned with alcohol and a band-aid was placed over the injection site. The patient tolerated the procedure well. There were no complications during or after the procedure. The patient reported improvement in symptoms following the procedure. Post-procedure care instructions and follow up appointment were given. If there are any complications, the patient was instructed to call the office.  \par

## 2023-03-14 ENCOUNTER — NON-APPOINTMENT (OUTPATIENT)
Age: 58
End: 2023-03-14

## 2023-05-22 ENCOUNTER — APPOINTMENT (OUTPATIENT)
Dept: PHYSICAL MEDICINE AND REHAB | Facility: CLINIC | Age: 58
End: 2023-05-22
Payer: MEDICAID

## 2023-05-22 VITALS
SYSTOLIC BLOOD PRESSURE: 110 MMHG | HEART RATE: 79 BPM | WEIGHT: 162 LBS | HEIGHT: 65 IN | BODY MASS INDEX: 26.99 KG/M2 | DIASTOLIC BLOOD PRESSURE: 69 MMHG | RESPIRATION RATE: 18 BRPM

## 2023-05-22 DIAGNOSIS — M76.01 GLUTEAL TENDINITIS, RIGHT HIP: ICD-10-CM

## 2023-05-22 DIAGNOSIS — M67.959 UNSPECIFIED DISORDER OF SYNOVIUM AND TENDON, UNSPECIFIED THIGH: ICD-10-CM
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
## 2023-05-22 DIAGNOSIS — M54.9 DORSALGIA, UNSPECIFIED: ICD-10-CM

## 2023-05-22 PROCEDURE — 99214 OFFICE O/P EST MOD 30 MIN: CPT

## 2023-05-22 NOTE — ASSESSMENT
[FreeTextEntry1] : \par PLAN AND RECOMMENDATIONS :\par \par We discussed differential diagnosis and clinical impression\par next step is trial of PRP spun platelets -she is willing to try \par will refer Dr MENG ortho regenerative med \par Recommend\par .symptomatic care and support\par  medications cont tylenol \par  imaging done \par  referral to ortho \par  hydrotherapy /heat / cold for pain\par  continue  spinal precautions including care with bending, lifting, twisting and  carrying.\par cont HEP and maintain good weight BMI 29\par  relative rest and avoidance of painful activity where possible \par  increasing activity as discussed \par  return for follow up prn \par nothing more to offer \par

## 2023-05-22 NOTE — HISTORY OF PRESENT ILLNESS
[FreeTextEntry1] : Ms. ARACELIS MCKEON is a very pleasant 57 year female who seen for evaluation of R Hip thigh pains   that has been ongoing for > a year after 2 falls on the sidewalk onto R knee and then lateral thigh  without any specific injury or inciting event\par she says the sidewalks were uneven or cracked and she has started legal proceedings\par she saw dr Worrell and had 2 GT injections under US as well as PT quite a few sessions -no better at all \par he is now at [facility] which is too far for her to go\par \par . The pain is located primarily R  lateral thigh  GT and lower R back intermittent in nature and described as dull at times sharp  . The pain is rated as 5/10 during today's visit, and ranges from 4-8/10. The patient's symptoms are aggravated by standing and walking -she walks a lot as she is a    and alleviated by rest and tylenol she cannot take nsaids as she only has one kidney  . The patient denies any night pain, numbness/tingling, weakness, or bowel/bladder dysfunction. The patient has no other complaints at this time.\par MR shows partial labral tear and tendonitis glut muscles \par \par

## 2023-05-22 NOTE — REVIEW OF SYSTEMS
[Patient Intake Form Reviewed] : Patient intake form was reviewed [Fever] : no fever [Recent Change In Weight] : ~T no recent weight change [Lower Ext Edema] : no lower extremity edema [Muscle Pain] : muscle pain [Muscle Weakness] : no muscle weakness [Difficulty Walking] : difficulty walking

## 2023-05-22 NOTE — DATA REVIEWED
[MRI] : MRI [FreeTextEntry1] : \par   MR Hip No Cont, Right             Final\par \par No Documents Attached\par \par \par \par \par   EXAM: [id number] - MR HIP RT  - ORDERED BY: ZACHARY PETTY\par \par \par PROCEDURE DATE:  02/08/2023\par \par \par \par INTERPRETATION:  History: Right hip pain, gluteal tendinopathy\par \par Technique: Magnetic resonance imaging of the right hip was performed without intravenous contrast according to standard protocol.\par \par Comparison: None available\par \par Findings:\par \par There is partial tearing involving the anterosuperior labrum.  Cartilage over the hip joint is preserved. There is no joint effusion. There is mild gluteus medius/minimus tendinosis with superimposed partial tear involving the lateral aspects of both entheses.  There is no greater trochanteric bursitis or iliopsoas bursitis. The fat planes surrounding the sciatic nerve are preserved. The hamstring origins are maintained.\par \par There is no fracture or osteonecrosis.  There is no evidence of stress reaction.  The intrapelvic organs are within normal limits.\par \par Impression: There is mild gluteus medius/minimus tendinosis with superimposed partial tear involving the lateral aspects of both entheses.\par \par -\par \par \par \par \par \par

## 2023-05-22 NOTE — PHYSICAL EXAM
[FreeTextEntry1] : PHYSICAL EXAM : OBJECTIVE \par \par GENERAL : Awake ,alert and oriented to time place and person \par HEAD : normocephalic and atraumatic \par NECK : supple ,no tracheal deviation ,no thyroid enlargement noted with swallowing\par EYES : sclera and conjunctiva normal no redness,intact extraocular movements \par ENT  : ears and nose normal in appearance -hearing adequate \par PULMONARY: effort normal. No respiratory distress. breathing regular. No wheezes \par LYMPH : No swelling in limbs, capillary return within normal range \par CVS : warm extremities,no palpitations,not short of breath, no visible jugular venous distention\par PSYCH : mood and affect normal ,good eye contact ,normal attention \par ABDOMEN : no visible distension , \par NEUROLOGICAL:cranial nerves intact,muscle tone normal,gait and balance safe except where noted below \par SKIN : warm and dry No rash detected over specific body areas examined \par MUSCULOSKELETAL: normal muscle bulk, no focal bony tenderness /posture normal except where specified below\par ROM hip full painfree and similar raven\par no pain \par spine FF 50 stiff non tender some pain over R SIJ \par ext 30 ok \par SLR neg \par \par gait NL \par no scoliosis \par no masses \par No long tract signs found on clinical exam and no focal neurological deficits\par MMT R leg 5/5 quads

## 2023-06-14 ENCOUNTER — APPOINTMENT (OUTPATIENT)
Dept: ORTHOPEDIC SURGERY | Facility: CLINIC | Age: 58
End: 2023-06-14

## 2023-06-20 DIAGNOSIS — M25.551 PAIN IN RIGHT HIP: ICD-10-CM

## 2023-06-27 ENCOUNTER — APPOINTMENT (OUTPATIENT)
Dept: ORTHOPEDIC SURGERY | Facility: CLINIC | Age: 58
End: 2023-06-27
Payer: MEDICAID

## 2023-06-27 DIAGNOSIS — M25.551 PAIN IN RIGHT HIP: ICD-10-CM

## 2023-06-27 DIAGNOSIS — G89.29 PAIN IN RIGHT HIP: ICD-10-CM

## 2023-06-27 DIAGNOSIS — S73.191S OTHER SPRAIN OF RIGHT HIP, SEQUELA: ICD-10-CM

## 2023-06-27 PROCEDURE — 99215 OFFICE O/P EST HI 40 MIN: CPT | Mod: 25

## 2023-06-27 PROCEDURE — 73502 X-RAY EXAM HIP UNI 2-3 VIEWS: CPT | Mod: RT

## 2023-06-27 PROCEDURE — 20611 DRAIN/INJ JOINT/BURSA W/US: CPT | Mod: RT

## 2023-09-21 ENCOUNTER — APPOINTMENT (OUTPATIENT)
Dept: INTERNAL MEDICINE | Facility: CLINIC | Age: 58
End: 2023-09-21

## 2023-09-26 ENCOUNTER — APPOINTMENT (OUTPATIENT)
Dept: ORTHOPEDIC SURGERY | Facility: CLINIC | Age: 58
End: 2023-09-26
Payer: MEDICAID

## 2023-09-26 DIAGNOSIS — M25.551 PAIN IN RIGHT HIP: ICD-10-CM

## 2023-09-26 PROCEDURE — 99213 OFFICE O/P EST LOW 20 MIN: CPT

## 2023-10-02 ENCOUNTER — APPOINTMENT (OUTPATIENT)
Dept: INTERNAL MEDICINE | Facility: CLINIC | Age: 58
End: 2023-10-02
Payer: MEDICAID

## 2023-10-02 VITALS
DIASTOLIC BLOOD PRESSURE: 76 MMHG | BODY MASS INDEX: 28.82 KG/M2 | OXYGEN SATURATION: 98 % | HEIGHT: 65 IN | WEIGHT: 173 LBS | HEART RATE: 82 BPM | SYSTOLIC BLOOD PRESSURE: 116 MMHG | TEMPERATURE: 98.6 F

## 2023-10-02 DIAGNOSIS — Z00.00 ENCOUNTER FOR GENERAL ADULT MEDICAL EXAMINATION W/OUT ABNORMAL FINDINGS: ICD-10-CM

## 2023-10-02 DIAGNOSIS — Z13.220 ENCOUNTER FOR SCREENING FOR LIPOID DISORDERS: ICD-10-CM

## 2023-10-02 PROBLEM — M25.551 GREATER TROCHANTERIC PAIN SYNDROME OF RIGHT LOWER EXTREMITY: Status: ACTIVE | Noted: 2022-01-19

## 2023-10-02 PROCEDURE — 99396 PREV VISIT EST AGE 40-64: CPT

## 2023-10-02 RX ORDER — SULFAMETHOXAZOLE AND TRIMETHOPRIM 800; 160 MG/1; MG/1
800-160 TABLET ORAL
Qty: 6 | Refills: 0 | Status: DISCONTINUED | COMMUNITY
Start: 2022-11-16 | End: 2023-10-02

## 2023-10-02 RX ORDER — TAMOXIFEN CITRATE 20 MG/1
TABLET, FILM COATED ORAL
Refills: 0 | Status: DISCONTINUED | COMMUNITY
End: 2023-10-02

## 2023-10-02 RX ORDER — LAMOTRIGINE 250 MG/1
250 TABLET, EXTENDED RELEASE ORAL DAILY
Refills: 0 | Status: ACTIVE | COMMUNITY
Start: 2023-10-02

## 2023-10-02 RX ORDER — LAMOTRIGINE 100 MG/1
100 TABLET ORAL DAILY
Refills: 0 | Status: DISCONTINUED | COMMUNITY
Start: 2022-09-19 | End: 2023-10-02

## 2023-10-02 RX ORDER — TAMOXIFEN CITRATE 20 MG/1
20 TABLET, FILM COATED ORAL DAILY
Refills: 0 | Status: ACTIVE | COMMUNITY
Start: 2023-10-02

## 2023-11-06 ENCOUNTER — APPOINTMENT (OUTPATIENT)
Dept: ORTHOPEDIC SURGERY | Facility: CLINIC | Age: 58
End: 2023-11-06

## 2024-01-16 ENCOUNTER — APPOINTMENT (OUTPATIENT)
Dept: INTERNAL MEDICINE | Facility: CLINIC | Age: 59
End: 2024-01-16

## 2024-01-23 ENCOUNTER — APPOINTMENT (OUTPATIENT)
Dept: INTERNAL MEDICINE | Facility: CLINIC | Age: 59
End: 2024-01-23
Payer: MEDICAID

## 2024-01-23 VITALS
OXYGEN SATURATION: 96 % | DIASTOLIC BLOOD PRESSURE: 82 MMHG | SYSTOLIC BLOOD PRESSURE: 122 MMHG | HEIGHT: 65 IN | HEART RATE: 98 BPM | WEIGHT: 168 LBS | TEMPERATURE: 98.2 F | BODY MASS INDEX: 27.99 KG/M2

## 2024-01-23 PROCEDURE — G2211 COMPLEX E/M VISIT ADD ON: CPT

## 2024-01-23 PROCEDURE — 93000 ELECTROCARDIOGRAM COMPLETE: CPT

## 2024-01-23 PROCEDURE — 99214 OFFICE O/P EST MOD 30 MIN: CPT

## 2024-01-23 NOTE — PHYSICAL EXAM
[No Acute Distress] : no acute distress [Well Nourished] : well nourished [Well Developed] : well developed [Well-Appearing] : well-appearing [No Respiratory Distress] : no respiratory distress  [No Accessory Muscle Use] : no accessory muscle use [Clear to Auscultation] : lungs were clear to auscultation bilaterally [Normal Rate] : normal rate  [Regular Rhythm] : with a regular rhythm [Normal S1, S2] : normal S1 and S2 [No Murmur] : no murmur heard [No Edema] : there was no peripheral edema [Soft] : abdomen soft [Non Tender] : non-tender [Non-distended] : non-distended [No Masses] : no abdominal mass palpated [No HSM] : no HSM [Normal Bowel Sounds] : normal bowel sounds [Normal] : no CVA or spinal tenderness [Grossly Normal Strength/Tone] : grossly normal strength/tone [No Joint Swelling] : no joint swelling [No Rash] : no rash [Coordination Grossly Intact] : coordination grossly intact [Normal Gait] : normal gait [No Focal Deficits] : no focal deficits [Normal Affect] : the affect was normal [Deep Tendon Reflexes (DTR)] : deep tendon reflexes were 2+ and symmetric [Normal Insight/Judgement] : insight and judgment were intact

## 2024-01-30 NOTE — ASSESSMENT
[FreeTextEntry1] : 58yo F PMHx anxiety, depression, congenital single kidney, breast Ca s/p lumpectomy and radiation, now on Tamoxifen 20mg qd, who presents for SOB.   #SOB  Reports VINES which usually comes on when she gains weight and cramping/chest pressure of upper stomach. Reports she went to [employer] on plane 8/2023. DDX includes PE (denies hemoptysis) vs MI vs pulm (no fever, chills, cough).  - EKG shows possible L atrial enlargement in V1 but no signs in II. Not tachycardic, no S1Q3T3 pattern. Satting well on RA - D-dimer likely elevated in this patient due to hx of breast cancer. Consider recent flight to [place], persistent SOB, f/u CTAPE- currently on tamoxifen, recent hx of breast cancer, r/o PE. CT could also show other lung pathology possibly 2/2 radiation- atelectasis  #GERD Reports previous taking nexium but no formal dx of GERD. Reports drinking 1-2 coffees/day, rare spicy food. Reports stressful job as  and due to personal hx of breast CA which has finally settled down  - start on omeprazole 20mg daily  - consider GI consult if no improvement  #lipid screening  Pt reports oncologist missy lipid panel in 10/2023 (faxed over CMP/CBC from 4/2023) - pt will request labs be faxed over   RTC in 6 weeks to f/u imaging and reflux/SOB symptoms

## 2024-01-30 NOTE — REVIEW OF SYSTEMS
[Recent Change In Weight] : ~T recent weight change [Chest Pain] : chest pain [Dyspnea on Exertion] : dyspnea on exertion [Negative] : Heme/Lymph [Fever] : no fever [Chills] : no chills [Fatigue] : no fatigue [Hot Flashes] : no hot flashes [Night Sweats] : no night sweats [Shortness Of Breath] : no shortness of breath [Wheezing] : no wheezing [Cough] : no cough [Suicidal] : not suicidal [Insomnia] : no insomnia [Anxiety] : no anxiety [Depression] : no depression [FreeTextEntry2] : Reports weight gain [FreeTextEntry5] : chest pressure

## 2024-01-30 NOTE — HISTORY OF PRESENT ILLNESS
[FreeTextEntry8] : 58yo F PMHx anxiety, depression, congenital single kidney, breast CA s/p lumpectomy and radiation, now on Tamoxifen 20mg qd, who presents for concerns for SOB. Reports she normally notes SOB when she gains weight which she reports she has. She lost a significant amount of weight when she had PNA and has been gaining it all back. She reports VINES but is still able to ambulate 5k steps. Denies cough, fever, chills, congestion, sick contacts. She reports compliance with tamoxifen 20mg daily. Denies personal/family hx of clots. 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 She denies SOB is related to anxiety and she is still currently on lamotrigine 250 but has cut down alprazolam to 0.25 TID PRN of which she takes it around once a day in the morning   Reports chest pressures that travels up and down midesophagus. Denies hx of GERD but notes she was prescribed nexium in past which she does not recall helped or not. Denies acid taste when lying flat. Reports 6/2022 (also noted in [facility] clinic note 9/22) when she went to a new PCP she was told she had signs of an old heart attack, was referred to cardiologist who noted normal EKG and normal stress test. Denies personal and family hx of clots.   She continues to follow with Dr. Vieyra for oncology and keeps up with maintenance screening
0 = swallows foods/liquids without difficulty

## 2024-02-06 ENCOUNTER — APPOINTMENT (OUTPATIENT)
Dept: INTERNAL MEDICINE | Facility: CLINIC | Age: 59
End: 2024-02-06

## 2024-02-21 ENCOUNTER — RX RENEWAL (OUTPATIENT)
Age: 59
End: 2024-02-21

## 2024-02-21 RX ORDER — OMEPRAZOLE 20 MG/1
20 CAPSULE, DELAYED RELEASE ORAL DAILY
Qty: 90 | Refills: 0 | Status: ACTIVE | COMMUNITY
Start: 2024-01-23 | End: 1900-01-01

## 2024-04-09 ENCOUNTER — OUTPATIENT (OUTPATIENT)
Dept: OUTPATIENT SERVICES | Facility: HOSPITAL | Age: 59
LOS: 1 days | End: 2024-04-09
Payer: COMMERCIAL

## 2024-04-09 ENCOUNTER — APPOINTMENT (OUTPATIENT)
Dept: CT IMAGING | Facility: HOSPITAL | Age: 59
End: 2024-04-09

## 2024-04-09 LAB — POCT ISTAT CREATININE: 0.9 MG/DL — SIGNIFICANT CHANGE UP (ref 0.5–1.3)

## 2024-04-09 PROCEDURE — 82565 ASSAY OF CREATININE: CPT

## 2024-04-09 PROCEDURE — 71275 CT ANGIOGRAPHY CHEST: CPT | Mod: 26

## 2024-04-09 PROCEDURE — 71275 CT ANGIOGRAPHY CHEST: CPT

## 2024-04-14 ENCOUNTER — TRANSCRIPTION ENCOUNTER (OUTPATIENT)
Age: 59
End: 2024-04-14

## 2024-05-16 ENCOUNTER — APPOINTMENT (OUTPATIENT)
Dept: INTERNAL MEDICINE | Facility: CLINIC | Age: 59
End: 2024-05-16
Payer: MEDICAID

## 2024-05-16 VITALS
DIASTOLIC BLOOD PRESSURE: 78 MMHG | TEMPERATURE: 97.9 F | SYSTOLIC BLOOD PRESSURE: 114 MMHG | HEART RATE: 91 BPM | OXYGEN SATURATION: 99 % | BODY MASS INDEX: 27.65 KG/M2 | WEIGHT: 170 LBS | HEIGHT: 65.75 IN

## 2024-05-16 DIAGNOSIS — F41.9 ANXIETY DISORDER, UNSPECIFIED: ICD-10-CM

## 2024-05-16 DIAGNOSIS — K21.9 GASTRO-ESOPHAGEAL REFLUX DISEASE W/OUT ESOPHAGITIS: ICD-10-CM

## 2024-05-16 DIAGNOSIS — F32.A DEPRESSION, UNSPECIFIED: ICD-10-CM

## 2024-05-16 DIAGNOSIS — R10.9 UNSPECIFIED ABDOMINAL PAIN: ICD-10-CM

## 2024-05-16 DIAGNOSIS — R06.02 SHORTNESS OF BREATH: ICD-10-CM

## 2024-05-16 DIAGNOSIS — C50.919 MALIGNANT NEOPLASM OF UNSPECIFIED SITE OF UNSPECIFIED FEMALE BREAST: ICD-10-CM

## 2024-05-16 DIAGNOSIS — R06.00 DYSPNEA, UNSPECIFIED: ICD-10-CM

## 2024-05-16 PROCEDURE — 99204 OFFICE O/P NEW MOD 45 MIN: CPT

## 2024-05-16 PROCEDURE — G2211 COMPLEX E/M VISIT ADD ON: CPT | Mod: NC,1L

## 2024-05-20 PROBLEM — C50.919 MALIGNANT NEOPLASM OF FEMALE BREAST, UNSPECIFIED ESTROGEN RECEPTOR STATUS, UNSPECIFIED LATERALITY, UNSPECIFIED SITE OF BREAST: Status: ACTIVE | Noted: 2023-10-02

## 2024-05-20 PROBLEM — K21.9 GERD (GASTROESOPHAGEAL REFLUX DISEASE): Status: ACTIVE | Noted: 2024-01-23

## 2024-05-20 PROBLEM — R10.9 ABDOMINAL PAIN: Status: ACTIVE | Noted: 2022-12-05

## 2024-05-20 PROBLEM — F32.A DEPRESSION: Status: ACTIVE | Noted: 2022-09-19

## 2024-05-20 PROBLEM — R06.00 DYSPNEA: Status: ACTIVE | Noted: 2024-02-06

## 2024-05-20 PROBLEM — F41.9 ANXIETY: Status: ACTIVE | Noted: 2022-09-19

## 2024-05-20 PROBLEM — R06.02 SHORTNESS OF BREATH: Status: ACTIVE | Noted: 2024-01-23

## 2024-05-20 NOTE — HEALTH RISK ASSESSMENT
[0] : 2) Feeling down, depressed, or hopeless: Not at all (0) [PHQ-2 Negative - No further assessment needed] : PHQ-2 Negative - No further assessment needed [TCZ1Brcyg] : 0

## 2024-05-20 NOTE — HISTORY OF PRESENT ILLNESS
[FreeTextEntry1] : establish care [de-identified] : 56yo F PMHx anxiety, depression, congenital single kidney, breast CA s/p lumpectomy and radiation, now on Tamoxifen 20mg qd, who presents to establish care, and discuss her abdominal pain and VINES.  Abdominal pain - pt reports having a very difficult childhood and she physically manifested her stress with severe nausea and abdominal pain. she feels currently feelbs stressed and believes that she is physically manifesting her stress. hx of EGD and Cscope WNL. follows with GI. no new foods, meds or supplements, no changes in stool caliber or stool color.  VINES - which usually comes on when she gains weight and cramping/chest pressure of upper stomach. reports hx of w/u in past by prior pcp. hx of D-dimer elevated  iso hx of breast cancer at that time recnt flight to [place]

## 2024-07-16 ENCOUNTER — OUTPATIENT (OUTPATIENT)
Dept: OUTPATIENT SERVICES | Facility: HOSPITAL | Age: 59
LOS: 1 days | End: 2024-07-16
Payer: MEDICAID

## 2024-07-16 ENCOUNTER — RESULT REVIEW (OUTPATIENT)
Age: 59
End: 2024-07-16

## 2024-07-16 DIAGNOSIS — R06.02 SHORTNESS OF BREATH: ICD-10-CM

## 2024-07-16 PROCEDURE — 93306 TTE W/DOPPLER COMPLETE: CPT | Mod: 26

## 2024-07-16 PROCEDURE — 93306 TTE W/DOPPLER COMPLETE: CPT

## 2024-07-22 ENCOUNTER — APPOINTMENT (OUTPATIENT)
Dept: PULMONOLOGY | Facility: CLINIC | Age: 59
End: 2024-07-22

## 2024-07-30 ENCOUNTER — NON-APPOINTMENT (OUTPATIENT)
Age: 59
End: 2024-07-30

## 2024-08-27 ENCOUNTER — APPOINTMENT (OUTPATIENT)
Dept: GASTROENTEROLOGY | Facility: CLINIC | Age: 59
End: 2024-08-27

## 2024-10-16 DIAGNOSIS — E78.00 PURE HYPERCHOLESTEROLEMIA, UNSPECIFIED: ICD-10-CM

## 2025-03-21 ENCOUNTER — APPOINTMENT (OUTPATIENT)
Dept: ORTHOPEDIC SURGERY | Facility: CLINIC | Age: 60
End: 2025-03-21
Payer: MEDICAID

## 2025-03-21 VITALS — WEIGHT: 157 LBS | BODY MASS INDEX: 24.64 KG/M2 | HEIGHT: 67 IN

## 2025-03-21 DIAGNOSIS — M54.50 LOW BACK PAIN, UNSPECIFIED: ICD-10-CM

## 2025-03-21 DIAGNOSIS — M51.369: ICD-10-CM

## 2025-03-21 PROCEDURE — 72110 X-RAY EXAM L-2 SPINE 4/>VWS: CPT

## 2025-03-21 PROCEDURE — 99214 OFFICE O/P EST MOD 30 MIN: CPT

## 2025-03-21 RX ORDER — LIDOCAINE 5% 700 MG/1
5 PATCH TOPICAL
Qty: 1 | Refills: 2 | Status: ACTIVE | COMMUNITY
Start: 2025-03-21 | End: 1900-01-01

## 2025-03-25 PROBLEM — M51.369 LUMBAR DEGENERATIVE DISC DISEASE: Status: ACTIVE | Noted: 2022-01-19

## 2025-05-15 ENCOUNTER — APPOINTMENT (OUTPATIENT)
Dept: PHYSICAL MEDICINE AND REHAB | Facility: CLINIC | Age: 60
End: 2025-05-15
Payer: MEDICAID

## 2025-05-15 VITALS
DIASTOLIC BLOOD PRESSURE: 70 MMHG | BODY MASS INDEX: 24.64 KG/M2 | HEART RATE: 82 BPM | OXYGEN SATURATION: 99 % | SYSTOLIC BLOOD PRESSURE: 102 MMHG | HEIGHT: 67 IN | WEIGHT: 157 LBS

## 2025-05-15 DIAGNOSIS — M53.3 SACROCOCCYGEAL DISORDERS, NOT ELSEWHERE CLASSIFIED: ICD-10-CM

## 2025-05-15 DIAGNOSIS — M25.551 PAIN IN RIGHT HIP: ICD-10-CM

## 2025-05-15 PROCEDURE — 99215 OFFICE O/P EST HI 40 MIN: CPT

## 2025-05-27 ENCOUNTER — APPOINTMENT (OUTPATIENT)
Dept: PHYSICAL MEDICINE AND REHAB | Facility: CLINIC | Age: 60
End: 2025-05-27
Payer: MEDICAID

## 2025-05-27 VITALS
OXYGEN SATURATION: 98 % | BODY MASS INDEX: 24.64 KG/M2 | DIASTOLIC BLOOD PRESSURE: 80 MMHG | WEIGHT: 157 LBS | HEIGHT: 67 IN | SYSTOLIC BLOOD PRESSURE: 124 MMHG | HEART RATE: 80 BPM

## 2025-05-27 PROCEDURE — 20552 NJX 1/MLT TRIGGER POINT 1/2: CPT | Mod: 59,RT

## 2025-05-27 PROCEDURE — 20611 DRAIN/INJ JOINT/BURSA W/US: CPT | Mod: RT

## 2025-07-17 ENCOUNTER — APPOINTMENT (OUTPATIENT)
Dept: PHYSICAL MEDICINE AND REHAB | Facility: CLINIC | Age: 60
End: 2025-07-17
Payer: MEDICAID

## 2025-07-17 VITALS — OXYGEN SATURATION: 97 % | DIASTOLIC BLOOD PRESSURE: 68 MMHG | HEART RATE: 86 BPM | SYSTOLIC BLOOD PRESSURE: 104 MMHG

## 2025-07-17 PROCEDURE — 99214 OFFICE O/P EST MOD 30 MIN: CPT

## 2025-07-17 RX ORDER — DICLOFENAC SODIUM 20 MG/G
2 SOLUTION TOPICAL 4 TIMES DAILY
Qty: 60 | Refills: 0 | Status: ACTIVE | COMMUNITY
Start: 2025-07-17 | End: 1900-01-01